# Patient Record
Sex: FEMALE | Race: BLACK OR AFRICAN AMERICAN | NOT HISPANIC OR LATINO | Employment: STUDENT | URBAN - METROPOLITAN AREA
[De-identification: names, ages, dates, MRNs, and addresses within clinical notes are randomized per-mention and may not be internally consistent; named-entity substitution may affect disease eponyms.]

---
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
## 2024-07-25 ENCOUNTER — HOSPITAL ENCOUNTER (EMERGENCY)
Facility: HOSPITAL | Age: 19
Discharge: HOME | End: 2024-07-25
Attending: EMERGENCY MEDICINE
Payer: MEDICAID

## 2024-07-25 VITALS
RESPIRATION RATE: 13 BRPM | DIASTOLIC BLOOD PRESSURE: 97 MMHG | SYSTOLIC BLOOD PRESSURE: 136 MMHG | BODY MASS INDEX: 17.37 KG/M2 | WEIGHT: 110.88 LBS | TEMPERATURE: 98 F | OXYGEN SATURATION: 100 % | HEART RATE: 86 BPM

## 2024-07-25 DIAGNOSIS — I10 HYPERTENSION: ICD-10-CM

## 2024-07-25 DIAGNOSIS — R10.84 GENERALIZED ABDOMINAL PAIN: Primary | ICD-10-CM

## 2024-07-25 LAB
ALBUMIN SERPL BCP-MCNC: 4.4 G/DL (ref 3.5–5.2)
ALP SERPL-CCNC: 81 U/L (ref 55–135)
ALT SERPL W/O P-5'-P-CCNC: 14 U/L (ref 10–44)
ANION GAP SERPL CALC-SCNC: 12 MMOL/L (ref 8–16)
AST SERPL-CCNC: 19 U/L (ref 10–40)
B-HCG UR QL: NEGATIVE
BACTERIA #/AREA URNS HPF: ABNORMAL /HPF
BASOPHILS # BLD AUTO: 0.03 K/UL (ref 0–0.2)
BASOPHILS NFR BLD: 0.6 % (ref 0–1.9)
BILIRUB SERPL-MCNC: 0.3 MG/DL (ref 0.1–1)
BILIRUB UR QL STRIP: NEGATIVE
BUN SERPL-MCNC: 5 MG/DL (ref 6–20)
CALCIUM SERPL-MCNC: 10.2 MG/DL (ref 8.7–10.5)
CHLORIDE SERPL-SCNC: 105 MMOL/L (ref 95–110)
CLARITY UR: CLEAR
CO2 SERPL-SCNC: 22 MMOL/L (ref 23–29)
COLOR UR: YELLOW
CREAT SERPL-MCNC: 0.9 MG/DL (ref 0.5–1.4)
DIFFERENTIAL METHOD BLD: NORMAL
EOSINOPHIL # BLD AUTO: 0.1 K/UL (ref 0–0.5)
EOSINOPHIL NFR BLD: 1.7 % (ref 0–8)
ERYTHROCYTE [DISTWIDTH] IN BLOOD BY AUTOMATED COUNT: 11.9 % (ref 11.5–14.5)
EST. GFR  (NO RACE VARIABLE): >60 ML/MIN/1.73 M^2
GLUCOSE SERPL-MCNC: 89 MG/DL (ref 70–110)
GLUCOSE UR QL STRIP: ABNORMAL
HCT VFR BLD AUTO: 43.8 % (ref 37–48.5)
HGB BLD-MCNC: 15.1 G/DL (ref 12–16)
HGB UR QL STRIP: NEGATIVE
HYALINE CASTS #/AREA URNS LPF: 5 /LPF
IMM GRANULOCYTES # BLD AUTO: 0 K/UL (ref 0–0.04)
IMM GRANULOCYTES NFR BLD AUTO: 0 % (ref 0–0.5)
KETONES UR QL STRIP: ABNORMAL
LEUKOCYTE ESTERASE UR QL STRIP: ABNORMAL
LIPASE SERPL-CCNC: 34 U/L (ref 4–60)
LYMPHOCYTES # BLD AUTO: 2.5 K/UL (ref 1–4.8)
LYMPHOCYTES NFR BLD: 47.1 % (ref 18–48)
MCH RBC QN AUTO: 30.1 PG (ref 27–31)
MCHC RBC AUTO-ENTMCNC: 34.5 G/DL (ref 32–36)
MCV RBC AUTO: 87 FL (ref 82–98)
MICROSCOPIC COMMENT: ABNORMAL
MONOCYTES # BLD AUTO: 0.3 K/UL (ref 0.3–1)
MONOCYTES NFR BLD: 5.6 % (ref 4–15)
NEUTROPHILS # BLD AUTO: 2.3 K/UL (ref 1.8–7.7)
NEUTROPHILS NFR BLD: 45 % (ref 38–73)
NITRITE UR QL STRIP: NEGATIVE
NRBC BLD-RTO: 0 /100 WBC
PH UR STRIP: 7 [PH] (ref 5–8)
PLATELET # BLD AUTO: 247 K/UL (ref 150–450)
PMV BLD AUTO: 9.2 FL (ref 9.2–12.9)
POTASSIUM SERPL-SCNC: 3.4 MMOL/L (ref 3.5–5.1)
PROT SERPL-MCNC: 8.5 G/DL (ref 6–8.4)
PROT UR QL STRIP: ABNORMAL
RBC # BLD AUTO: 5.02 M/UL (ref 4–5.4)
RBC #/AREA URNS HPF: 2 /HPF (ref 0–4)
SODIUM SERPL-SCNC: 139 MMOL/L (ref 136–145)
SP GR UR STRIP: >1.03 (ref 1–1.03)
SQUAMOUS #/AREA URNS HPF: 4 /HPF
TROPONIN I SERPL DL<=0.01 NG/ML-MCNC: 0.01 NG/ML (ref 0–0.03)
URN SPEC COLLECT METH UR: ABNORMAL
UROBILINOGEN UR STRIP-ACNC: ABNORMAL EU/DL
WBC # BLD AUTO: 5.2 K/UL (ref 3.9–12.7)
WBC #/AREA URNS HPF: 12 /HPF (ref 0–5)

## 2024-07-25 PROCEDURE — 83690 ASSAY OF LIPASE: CPT

## 2024-07-25 PROCEDURE — 93005 ELECTROCARDIOGRAM TRACING: CPT

## 2024-07-25 PROCEDURE — 99285 EMERGENCY DEPT VISIT HI MDM: CPT | Mod: 25

## 2024-07-25 PROCEDURE — 81000 URINALYSIS NONAUTO W/SCOPE: CPT

## 2024-07-25 PROCEDURE — 63600175 PHARM REV CODE 636 W HCPCS: Performed by: EMERGENCY MEDICINE

## 2024-07-25 PROCEDURE — 80053 COMPREHEN METABOLIC PANEL: CPT

## 2024-07-25 PROCEDURE — 84484 ASSAY OF TROPONIN QUANT: CPT

## 2024-07-25 PROCEDURE — 81025 URINE PREGNANCY TEST: CPT

## 2024-07-25 PROCEDURE — 93010 ELECTROCARDIOGRAM REPORT: CPT | Mod: ,,, | Performed by: INTERNAL MEDICINE

## 2024-07-25 PROCEDURE — 85025 COMPLETE CBC W/AUTO DIFF WBC: CPT

## 2024-07-25 PROCEDURE — 96374 THER/PROPH/DIAG INJ IV PUSH: CPT

## 2024-07-25 PROCEDURE — 87088 URINE BACTERIA CULTURE: CPT

## 2024-07-25 PROCEDURE — 96375 TX/PRO/DX INJ NEW DRUG ADDON: CPT

## 2024-07-25 PROCEDURE — 87086 URINE CULTURE/COLONY COUNT: CPT

## 2024-07-25 RX ORDER — MORPHINE SULFATE 4 MG/ML
4 INJECTION, SOLUTION INTRAMUSCULAR; INTRAVENOUS
Status: COMPLETED | OUTPATIENT
Start: 2024-07-25 | End: 2024-07-25

## 2024-07-25 RX ORDER — ONDANSETRON HYDROCHLORIDE 2 MG/ML
4 INJECTION, SOLUTION INTRAVENOUS
Status: COMPLETED | OUTPATIENT
Start: 2024-07-25 | End: 2024-07-25

## 2024-07-25 RX ORDER — ACETAMINOPHEN 500 MG
1000 TABLET ORAL
Status: DISCONTINUED | OUTPATIENT
Start: 2024-07-25 | End: 2024-07-25

## 2024-07-25 RX ADMIN — ONDANSETRON 4 MG: 2 INJECTION INTRAMUSCULAR; INTRAVENOUS at 06:07

## 2024-07-25 RX ADMIN — MORPHINE SULFATE 4 MG: 4 INJECTION INTRAVENOUS at 06:07

## 2024-07-25 NOTE — ED PROVIDER NOTES
SCRIBE #1 NOTE: I, Leidy Estes, am scribing for, and in the presence of, Max Cortez Jr., MD. I have scribed the entire note.       History     Chief Complaint   Patient presents with    Abdominal Pain     Mid abdominal pain and dizziness x 1.5 weeks. Also c/o High blood pressure and HA. Denies N/V/D, denies vaginal discharge or difficulty with urination.     Review of patient's allergies indicates:   Allergen Reactions    Iodine and iodide containing products     Nuts [tree nut]     Shellfish containing products          History of Present Illness     HPI    7/25/2024, 5:37 PM  History obtained from the patient      History of Present Illness: Khloe Evans is a 19 y.o. female patient with a PMHx of asthma who presents to the Emergency Department for evaluation of abdominal pain. Pt also complains of HTN with associated HA and dizziness x couple months. Pt reports the only medication she takes daily is birth control which she has been on for 2 years.  Denies any recent prescription changes. Pt states she is passing gas. Pt denies pain worsening at night. Symptoms are intermittent and moderate in severity. No mitigating or exacerbating factors reported. Patient denies any N/V, constipation, diarrhea, and all other sxs at this time.  No further complaints or concerns at this time. Pt's mother is at bed side.  Patient was notes some mild generalized abdominal pain.  This has been ongoing for several months.  She was not had any imaging.  There no fevers or chills.  The pain in his vague.      Arrival mode: Personal vehicle    PCP: Malgorzata Manzano NP        Past Medical History:  Past Medical History:   Diagnosis Date    Allergic state     Allergy     Asthma     Asthma, not well controlled     Eczema        Past Surgical History:  Past Surgical History:   Procedure Laterality Date    None           Family History:  No family history on file.    Social History:  Social History     Tobacco Use    Smoking status:  Never    Smokeless tobacco: Never    Tobacco comments:     No ALEXANDR   Substance and Sexual Activity    Alcohol use: No     Alcohol/week: 0.0 standard drinks of alcohol    Drug use: No    Sexual activity: Never        Review of Systems     Review of Systems   Gastrointestinal:  Positive for abdominal pain. Negative for constipation, diarrhea, nausea and vomiting.   Neurological:  Positive for dizziness and headaches.        Physical Exam     Initial Vitals [07/25/24 1718]   BP Pulse Resp Temp SpO2   (!) 181/93 (!) 125 18 98.3 °F (36.8 °C) 100 %      MAP       --          Physical Exam  Nursing Notes and Vital Signs Reviewed.  Constitutional: Patient is in no acute distress. Well-developed and well-nourished.  Head: Atraumatic. Normocephalic.  Eyes:  EOM intact.  No scleral icterus.  ENT: Mucous membranes are moist.  Nares clear   Neck:  Full ROM. No JVD.  Cardiovascular: Regular rate. Regular rhythm No murmurs, rubs, or gallops. Distal pulses are 2+ and symmetric  Pulmonary/Chest: No respiratory distress. Clear to auscultation bilaterally. No wheezing or rales.  Equal chest wall rise bilaterally  Abdominal: Soft and non-distended.  There is no tenderness.  No rebound, guarding, or rigidity. Good bowel sounds.  Negative Garland's.  No right lower quadrant tenderness.  Genitourinary: No CVA tenderness.  No suprapubic tenderness  Musculoskeletal: Moves all extremities. No obvious deformities.  5 x 5 strength in all extremities   Skin: Warm and dry.  Neurological:  Alert, awake, and appropriate.  Normal speech.  No acute focal neurological deficits are appreciated.  Two through 12 intact bilaterally.  Psychiatric: Normal affect. Good eye contact. Appropriate in content.       ED Course   Procedures  ED Vital Signs:  Vitals:    07/25/24 1718 07/25/24 1848 07/25/24 1900 07/25/24 2002   BP: (!) 181/93  (!) 155/89 (!) 144/81   Pulse: (!) 125  95 65   Resp: 18 18 18 17   Temp: 98.3 °F (36.8 °C)  98.3 °F (36.8 °C)    TempSrc:  Oral      SpO2: 100%  100% 100%   Weight: 50.3 kg (110 lb 14.3 oz)       07/25/24 2040   BP: (!) 136/97   Pulse: 86   Resp: 13   Temp:    TempSrc:    SpO2: 100%   Weight:        Abnormal Lab Results:  Labs Reviewed   COMPREHENSIVE METABOLIC PANEL - Abnormal       Result Value    Sodium 139      Potassium 3.4 (*)     Chloride 105      CO2 22 (*)     Glucose 89      BUN 5 (*)     Creatinine 0.9      Calcium 10.2      Total Protein 8.5 (*)     Albumin 4.4      Total Bilirubin 0.3      Alkaline Phosphatase 81      AST 19      ALT 14      eGFR >60      Anion Gap 12     URINALYSIS, REFLEX TO URINE CULTURE - Abnormal    Specimen UA Urine, Clean Catch      Color, UA Yellow      Appearance, UA Clear      pH, UA 7.0      Specific Gravity, UA >1.030 (*)     Protein, UA 3+ (*)     Glucose, UA Trace (*)     Ketones, UA Trace (*)     Bilirubin (UA) Negative      Occult Blood UA Negative      Nitrite, UA Negative      Urobilinogen, UA 2.0-3.0 (*)     Leukocytes, UA 1+ (*)     Narrative:     Specimen Source->Urine   URINALYSIS MICROSCOPIC - Abnormal    RBC, UA 2      WBC, UA 12 (*)     Bacteria None      Squam Epithel, UA 4      Hyaline Casts, UA 5 (*)     Microscopic Comment SEE COMMENT      Narrative:     Specimen Source->Urine   CULTURE, URINE   CBC W/ AUTO DIFFERENTIAL    WBC 5.20      RBC 5.02      Hemoglobin 15.1      Hematocrit 43.8      MCV 87      MCH 30.1      MCHC 34.5      RDW 11.9      Platelets 247      MPV 9.2      Immature Granulocytes 0.0      Gran # (ANC) 2.3      Immature Grans (Abs) 0.00      Lymph # 2.5      Mono # 0.3      Eos # 0.1      Baso # 0.03      nRBC 0      Gran % 45.0      Lymph % 47.1      Mono % 5.6      Eosinophil % 1.7      Basophil % 0.6      Differential Method Automated     LIPASE    Lipase 34     PREGNANCY TEST, URINE RAPID    Preg Test, Ur Negative      Narrative:     Specimen Source->Urine   TROPONIN I    Troponin I 0.009          All Lab Results:  Results for orders placed or performed  during the hospital encounter of 07/25/24   CBC W/ AUTO DIFFERENTIAL   Result Value Ref Range    WBC 5.20 3.90 - 12.70 K/uL    RBC 5.02 4.00 - 5.40 M/uL    Hemoglobin 15.1 12.0 - 16.0 g/dL    Hematocrit 43.8 37.0 - 48.5 %    MCV 87 82 - 98 fL    MCH 30.1 27.0 - 31.0 pg    MCHC 34.5 32.0 - 36.0 g/dL    RDW 11.9 11.5 - 14.5 %    Platelets 247 150 - 450 K/uL    MPV 9.2 9.2 - 12.9 fL    Immature Granulocytes 0.0 0.0 - 0.5 %    Gran # (ANC) 2.3 1.8 - 7.7 K/uL    Immature Grans (Abs) 0.00 0.00 - 0.04 K/uL    Lymph # 2.5 1.0 - 4.8 K/uL    Mono # 0.3 0.3 - 1.0 K/uL    Eos # 0.1 0.0 - 0.5 K/uL    Baso # 0.03 0.00 - 0.20 K/uL    nRBC 0 0 /100 WBC    Gran % 45.0 38.0 - 73.0 %    Lymph % 47.1 18.0 - 48.0 %    Mono % 5.6 4.0 - 15.0 %    Eosinophil % 1.7 0.0 - 8.0 %    Basophil % 0.6 0.0 - 1.9 %    Differential Method Automated    Comp. Metabolic Panel   Result Value Ref Range    Sodium 139 136 - 145 mmol/L    Potassium 3.4 (L) 3.5 - 5.1 mmol/L    Chloride 105 95 - 110 mmol/L    CO2 22 (L) 23 - 29 mmol/L    Glucose 89 70 - 110 mg/dL    BUN 5 (L) 6 - 20 mg/dL    Creatinine 0.9 0.5 - 1.4 mg/dL    Calcium 10.2 8.7 - 10.5 mg/dL    Total Protein 8.5 (H) 6.0 - 8.4 g/dL    Albumin 4.4 3.5 - 5.2 g/dL    Total Bilirubin 0.3 0.1 - 1.0 mg/dL    Alkaline Phosphatase 81 55 - 135 U/L    AST 19 10 - 40 U/L    ALT 14 10 - 44 U/L    eGFR >60 >60 mL/min/1.73 m^2    Anion Gap 12 8 - 16 mmol/L   Lipase   Result Value Ref Range    Lipase 34 4 - 60 U/L   Urinalysis, Reflex to Urine Culture Urine, Clean Catch    Specimen: Urine   Result Value Ref Range    Specimen UA Urine, Clean Catch     Color, UA Yellow Yellow, Straw, Luciana    Appearance, UA Clear Clear    pH, UA 7.0 5.0 - 8.0    Specific Gravity, UA >1.030 (A) 1.005 - 1.030    Protein, UA 3+ (A) Negative    Glucose, UA Trace (A) Negative    Ketones, UA Trace (A) Negative    Bilirubin (UA) Negative Negative    Occult Blood UA Negative Negative    Nitrite, UA Negative Negative    Urobilinogen, UA  2.0-3.0 (A) <2.0 EU/dL    Leukocytes, UA 1+ (A) Negative   Pregnancy, urine rapid   Result Value Ref Range    Preg Test, Ur Negative    Troponin I   Result Value Ref Range    Troponin I 0.009 0.000 - 0.026 ng/mL   Urinalysis Microscopic   Result Value Ref Range    RBC, UA 2 0 - 4 /hpf    WBC, UA 12 (H) 0 - 5 /hpf    Bacteria None None-Occ /hpf    Squam Epithel, UA 4 /hpf    Hyaline Casts, UA 5 (A) 0-1/lpf /lpf    Microscopic Comment SEE COMMENT          Imaging Results:  Imaging Results              CT Abdomen Pelvis  Without Contrast (Final result)  Result time 07/25/24 20:34:26      Final result by Rosemary Ortiz MD (07/25/24 20:34:26)                   Impression:      No acute finding      Electronically signed by: Rosemary Ortiz  Date:    07/25/2024  Time:    20:34               Narrative:    EXAMINATION:  CT ABDOMEN PELVIS WITHOUT CONTRAST    CLINICAL HISTORY:  Abdominal abscess/infection suspected;    TECHNIQUE:  Low dose axial images, sagittal and coronal reformations were obtained from the lung bases to the pubic symphysis.  Contrast was not administered.    COMPARISON:  None    FINDINGS:  Liver pancreas and spleen unremarkable unenhanced    Kidneys without hydronephrosis.  Faint medullary micro calcification possible.    Urinary bladder decompressed    No obstructive bowel findings.  No signs of appendicitis                                       X-Ray Chest AP Portable (Final result)  Result time 07/25/24 19:20:35      Final result by Rosemary Ortiz MD (07/25/24 19:20:35)                   Impression:      No acute abnormality.      Electronically signed by: Rosemary Ortiz  Date:    07/25/2024  Time:    19:20               Narrative:    EXAMINATION:  XR CHEST AP PORTABLE    CLINICAL HISTORY:  . Essential (primary) hypertension    TECHNIQUE:  Single frontal portable view of the chest was performed.    COMPARISON:  None    FINDINGS:  Support devices: None    The lungs are clear, with normal  appearance of pulmonary vasculature and no pleural effusion or pneumothorax.                                       The EKG was ordered, reviewed, and independently interpreted by the ED provider.  Interpretation time: 17:39  Rate: 83 BPM  Rhythm: normal sinus rhythm with sinus arrhythmia   Interpretation: No acute ST or T wave abnormalities. No STEMI.             The Emergency Provider reviewed the vital signs and test results, which are outlined above.     ED Discussion     8:43 PM: Reassessed pt at this time. Discussed with pt all pertinent ED information and results. Discussed pt dx and plan of tx. Gave pt all f/u and return to the ED instructions. All questions and concerns were addressed at this time. Pt expresses understanding of information and instructions, and is comfortable with plan to discharge. Pt is stable for discharge.    I discussed with patient and/or family/caretaker that evaluation in the ED does not suggest any emergent or life threatening medical conditions requiring immediate intervention beyond what was provided in the ED, and I believe patient is safe for discharge.  Regardless, an unremarkable evaluation in the ED does not preclude the development or presence of a serious of life threatening condition. As such, patient was instructed to return immediately for any worsening or change in current symptoms.      ED Course as of 07/25/24 2153   Thu Jul 25, 2024   1824 Cardiac monitor interpretation  Independent interpretation  Indication: Hypertension  Normal sinus rhythm.  Rate 85.  No STEMI [RT]      ED Course User Index  [RT] Max Cortez Jr., MD     Medical Decision Making  Differential diagnosis: Hypertension, hypertensive urgency, hypertensive emergency, cardiac event, abdominal pain, acid reflux    Patient was evaluated with the history and physical examination.  Patient was had longstanding abdominal pain has elevated blood pressure.  The blood pressure is asymptomatic however.   Patient was received morphine and a workup was undertaken.  Workup is negative.  Light of the patient was ongoing symptoms and prior evaluation CT imaging was ordered which is negative he was no evidence of end-organ damage from her blood pressure as well.  Discussed all findings with the patient was mother.  I will refer to GI and start Pepcid twice daily with Tums for breakthrough symptoms.  I will not start blood pressure medications at this time due to underlying symptoms and the fact that her blood pressure markedly improved with pain control.  Patient was does have an underlying history of anxiety and I will refer to her primary care provider for further evaluation and treatment if needed.  Patient was in her mother verbalized understanding and agreement with the plan of care and seems reliable    Amount and/or Complexity of Data Reviewed  Independent Historian: parent  Labs: ordered. Decision-making details documented in ED Course.     Details: Troponin and lipase are normal.  CMP shows a potassium 3.4 but is otherwise benign.  Normal white count.  Normal H&H.  UA does not show an infection.  UPT is negative  Radiology: ordered. Decision-making details documented in ED Course.     Details: Chest x-ray is negative.  CT abdomen and pelvis shows no acute findings  ECG/medicine tests: ordered and independent interpretation performed. Decision-making details documented in ED Course.  Discussion of management or test interpretation with external provider(s): No STEMI    Risk  OTC drugs.  Prescription drug management.  Parenteral controlled substances.  Decision regarding hospitalization.                ED Medication(s):  Medications   ondansetron injection 4 mg (4 mg Intravenous Given by Other 7/25/24 4363)   morphine injection 4 mg (4 mg Intravenous Given by Other 7/25/24 4878)       Discharge Medication List as of 7/25/2024  8:44 PM           Follow-up Information       Malgorzata Manzano NP.    Specialty:  Pediatrics  Contact information:  [address] [place] [address]  [phone]               Genny Nieves MD.    Specialty: Gastroenterology  Contact information:  [address] [facility]  [place] [address]  [phone]                                 Scribe Attestation:   Scribe #1: I performed the above scribed service and the documentation accurately describes the services I performed. I attest to the accuracy of the note.     Attending:   Physician Attestation Statement for Scribe #1: I, Max Cortez Jr., MD, personally performed the services described in this documentation, as scribed by Leidy Estes, in my presence, and it is both accurate and complete.           Clinical Impression       ICD-10-CM ICD-9-CM   1. Generalized abdominal pain  R10.84 789.07   2. Hypertension  I10 401.9       Disposition:   Disposition: Discharged  Condition: Stable         Max Cortez Jr., MD  07/25/24 8467

## 2024-07-25 NOTE — FIRST PROVIDER EVALUATION
Medical screening examination initiated.  I have conducted a focused provider triage encounter, findings are as follows:    Brief history of present illness:  Hypertension, headaches, abdominal pain for the past week and a half.  Reports that she has seen her primary care doctor for elevated blood pressure readings, has not put her on a blood pressure medications yet.    There were no vitals filed for this visit.    Pertinent physical exam:  Headache, hypertension, abdominal pain    Brief workup plan:  Workup    Preliminary workup initiated; this workup will be continued and followed by the physician or advanced practice provider that is assigned to the patient when roomed.

## 2024-07-26 LAB
OHS QRS DURATION: 72 MS
OHS QTC CALCULATION: 439 MS

## 2024-07-26 NOTE — DISCHARGE INSTRUCTIONS
Your high blood pressure is likely related to both your abdominal pain and your anxiety.  You may put you in danger to start blood pressure medications at this time therefore we will not start them.  I would recommend starting Pepcid 20 mg orally twice daily and use Tums for breakthrough symptoms free or abdominal pain.  This may resolve.  She is.  If so, your blood pressure showed improve.  Have your anxiety dressed by your primary care provider.  They may desire to stored she wants some medications for this.  I do recommend you see a GI doctor.  On-call for us today is listed above.  To your insurance, a referral from your primary care provider is required.  Police called both to arrange this follow up.  Return as needed

## 2024-07-26 NOTE — ED NOTES
"Pt reports HA that started 1 week ago, constant pain, denies any aggravating factors. Pain has subsided with Morphine. Pt also reports abdominal pain x 1 week that is located to the RLQ and umbilical area. Pt also reports feelings of "feeling like I was going to pass out". LMP 2 weeks ago and lasts 4-5 days, last BM yesterday, soft and brown, denies melena. Reports being sexually active and endorses condom use. Reports symptoms are worse at night time overall. Pt reports being diagnosed with anxiety but not being prescribed anything. Pt mother at bedside, bed in lowest position, call bell within reach, will continue to monitor.  "

## 2024-07-27 LAB — BACTERIA UR CULT: ABNORMAL

## 2024-07-31 ENCOUNTER — OFFICE VISIT (OUTPATIENT)
Dept: ALLERGY | Facility: CLINIC | Age: 19
End: 2024-07-31
Payer: MEDICAID

## 2024-07-31 VITALS
DIASTOLIC BLOOD PRESSURE: 78 MMHG | SYSTOLIC BLOOD PRESSURE: 127 MMHG | OXYGEN SATURATION: 98 % | BODY MASS INDEX: 17.37 KG/M2 | HEART RATE: 85 BPM | TEMPERATURE: 98 F | HEIGHT: 67 IN

## 2024-07-31 DIAGNOSIS — J30.89 ALLERGIC RHINITIS DUE TO MOLD: ICD-10-CM

## 2024-07-31 DIAGNOSIS — Z91.013 FISH ALLERGY: ICD-10-CM

## 2024-07-31 DIAGNOSIS — J30.81 ALLERGIC RHINITIS DUE TO ANIMAL DANDER: ICD-10-CM

## 2024-07-31 DIAGNOSIS — T78.1XXA ADVERSE FOOD REACTION, INITIAL ENCOUNTER: ICD-10-CM

## 2024-07-31 DIAGNOSIS — Z91.013 ALLERGY TO MOLLUSK: ICD-10-CM

## 2024-07-31 DIAGNOSIS — Z91.013 SHELLFISH ALLERGY: ICD-10-CM

## 2024-07-31 DIAGNOSIS — J30.1 SEASONAL ALLERGIC RHINITIS DUE TO POLLEN: Primary | ICD-10-CM

## 2024-07-31 DIAGNOSIS — L20.89 OTHER ATOPIC DERMATITIS: ICD-10-CM

## 2024-07-31 PROCEDURE — 95004 PERQ TESTS W/ALRGNC XTRCS: CPT | Mod: PBBFAC | Performed by: STUDENT IN AN ORGANIZED HEALTH CARE EDUCATION/TRAINING PROGRAM

## 2024-07-31 PROCEDURE — 99999 PR PBB SHADOW E&M-EST. PATIENT-LVL III: CPT | Mod: PBBFAC,,, | Performed by: STUDENT IN AN ORGANIZED HEALTH CARE EDUCATION/TRAINING PROGRAM

## 2024-07-31 PROCEDURE — 1159F MED LIST DOCD IN RCRD: CPT | Mod: CPTII,,, | Performed by: STUDENT IN AN ORGANIZED HEALTH CARE EDUCATION/TRAINING PROGRAM

## 2024-07-31 PROCEDURE — 95004 PERQ TESTS W/ALRGNC XTRCS: CPT | Mod: S$PBB,,, | Performed by: STUDENT IN AN ORGANIZED HEALTH CARE EDUCATION/TRAINING PROGRAM

## 2024-07-31 PROCEDURE — 99204 OFFICE O/P NEW MOD 45 MIN: CPT | Mod: S$PBB,25,, | Performed by: STUDENT IN AN ORGANIZED HEALTH CARE EDUCATION/TRAINING PROGRAM

## 2024-07-31 PROCEDURE — 3074F SYST BP LT 130 MM HG: CPT | Mod: CPTII,,, | Performed by: STUDENT IN AN ORGANIZED HEALTH CARE EDUCATION/TRAINING PROGRAM

## 2024-07-31 PROCEDURE — 3008F BODY MASS INDEX DOCD: CPT | Mod: CPTII,,, | Performed by: STUDENT IN AN ORGANIZED HEALTH CARE EDUCATION/TRAINING PROGRAM

## 2024-07-31 PROCEDURE — 99213 OFFICE O/P EST LOW 20 MIN: CPT | Mod: PBBFAC | Performed by: STUDENT IN AN ORGANIZED HEALTH CARE EDUCATION/TRAINING PROGRAM

## 2024-07-31 PROCEDURE — 3078F DIAST BP <80 MM HG: CPT | Mod: CPTII,,, | Performed by: STUDENT IN AN ORGANIZED HEALTH CARE EDUCATION/TRAINING PROGRAM

## 2024-07-31 PROCEDURE — 4010F ACE/ARB THERAPY RXD/TAKEN: CPT | Mod: CPTII,,, | Performed by: STUDENT IN AN ORGANIZED HEALTH CARE EDUCATION/TRAINING PROGRAM

## 2024-07-31 RX ORDER — TRIAMCINOLONE ACETONIDE 1 MG/G
OINTMENT TOPICAL 2 TIMES DAILY
Qty: 80 G | Refills: 11 | Status: SHIPPED | OUTPATIENT
Start: 2024-07-31 | End: 2025-07-31

## 2024-07-31 RX ORDER — EPINEPHRINE 0.3 MG/.3ML
1 INJECTION SUBCUTANEOUS ONCE
Qty: 0.3 ML | Refills: 0 | Status: SHIPPED | OUTPATIENT
Start: 2024-07-31 | End: 2024-07-31

## 2024-07-31 NOTE — PROGRESS NOTES
Allergy and Immunology  New Patient Clinic Note    Date: 7/31/2024  Chief Complaint   Patient presents with    Allergy Testing     Pt states that she wants to get tested for food and environmental allergies: Pt also states that she has been in and out of the hospital and mom made appointment to get her tested.      Referred by: Self, Aaareferral  No address on file    History  Khloe Evans is a 19 y.o. female being seen as a New Patient today.    Allergic Rhinitis due to dog, cockroach, mold, and tree/grass/weed pollen   - Onset: Early   - Symptoms: Congestion, rhinorrhea, sneezing - improved with age but still present   - Suspected triggers include: Environmental - prior positives in early childhood per patient   - Pattern: Perennial with Seasonal Exacerbations  - Medications: PRN antihistamines with no acute complaints at this time  - Patient does NOT have a dog     Chronic or Inducible Urticaria  - No hx of chronic urticaria     Mild Intermittent Asthma   - Onset: 2-3 years of age   - Symptoms: Chest tightness and SOB, wheezing   - Medications: Montelukast, albuterol PRN   - PO Steroids: Years since last time   - Hospitalization/Intubation: No   - Suspected triggers include: No clear trigger   - Smoking: No   - ASA/NSAID use: No   - Hx of CRSwNP: No   - Control/Albuterol Use: Well controlled, with minimal use of albuterol   - Nocturnal symptoms: Denied     CRSwNP  - No hx of CRSwNP     Eczema   - As an infant moderate with subsequent improvement   - Infrequent flares and mild disease at this time     Eosinophilic Esophagitis  - No hx of eosinophilic esophagitis     Food Allergy  - Tree Nuts: itching of mouth and throat when younger   - Patient reported avoiding - does NOT avoid peanuts   - Seafood: Lifelong avoidance with no hx of reactions   - Skin testing at a young age with positives and avoided without known exposure     Drug Allergy  - No hx of drug allergy     Recurrent Infections  - No hx of recurrent  infections     Venom Allergy  - No hx of venom allergy     Allergies, PMH, PSH, Social, and Family History were reviewed.    Review of patient's allergies indicates:   Allergen Reactions    Shellfish containing products       Past Medical History:   Diagnosis Date    Allergic state     Allergy     Asthma     Asthma, not well controlled     Eczema      Past Surgical History:   Procedure Laterality Date    None       Social History     Social History Narrative    Lives with family.    Mom is a teacher.  Dad is a .     S/he reports that she has never smoked. She has never used smokeless tobacco. She reports that she does not drink alcohol and does not use drugs.    Current Outpatient Medications on File Prior to Visit   Medication Sig Dispense Refill    albuterol (PROVENTIL) 2.5 mg /3 mL (0.083 %) nebulizer solution Take 2.5 mg by nebulization every 6 (six) hours as needed for Wheezing.      albuterol 90 mcg/actuation inhaler Inhale 2 puffs into the lungs every 4 (four) hours as needed for Wheezing. 1 Inhaler 2    albuterol 90 mcg/actuation inhaler Inhale 2 puffs into the lungs every 4 (four) hours as needed for Wheezing. Rescue 1 Inhaler 2    [DISCONTINUED] hydrOXYzine HCL (ATARAX) 25 MG tablet Take 1 tablet (25 mg total) by mouth 3 (three) times daily. 20 tablet 0    [DISCONTINUED] ibuprofen (ADVIL,MOTRIN) 400 MG tablet Take 1 tablet (400 mg total) by mouth every 6 (six) hours as needed. 20 tablet 0    [DISCONTINUED] beclomethasone (QVAR) 80 mcg/actuation Aero Inhale 1 puff into the lungs 2 (two) times daily. 1 each 2    [DISCONTINUED] cetirizine (ZYRTEC) 10 MG tablet Take 1 tablet (10 mg total) by mouth once daily. for 14 days 14 tablet 0     No current facility-administered medications on file prior to visit.     Physical Examination  Vitals:    07/31/24 1448   BP: 127/78   Pulse: 85   Temp: 97.7 °F (36.5 °C)     GENERAL:  female in no apparent distress and well developed and well nourished  HEAD:   Normocephalic, without obvious abnormality, atraumatic  EYES: sclera anicteric, conjunctiva normochromic  EARS: normal TM's and external ear canals both ears  NOSE: pale and boggy, clear and copious discharge, turbinates pink, swollen, swollen, grade 2 out of 4    OROPHARYNX: moist mucous membranes without erythema, exudates or petechiae  LYMPH NODES: normal, supple, no lymphadenopathy  LUNGS: clear to auscultation, no wheezes, rales or rhonchi, symmetric air entry.  HEART: normal rate, regular rhythm, normal S1, S2, no murmurs, rubs, clicks or gallops.  ABDOMEN: soft, nontender, nondistended, no masses or organomegaly.  MUSCULOSKELETAL: no gross joint deformity or swelling.  NEURO: alert, oriented, normal speech, no focal findings or movement disorder noted.  SKIN: normal coloration and turgor, no rashes, no suspicious skin lesions noted.     Allergy Skin Tests  Allergy skin prick tests to inhalants and foods were positive to: mold spores, dog dander, cockroach, tree pollen, grass pollen, weed pollen, crustaceans, shellfish, and finned fish and negative to house dust mites, cat dander, horse dander, peanuts, tree nuts, milk, eggs, and wheat with adequate histamine and negative control. Test is valid. Serum testing for shellfish due to large finned fish reaction possible bleeding into the crustacean testing.   - See media for results    Assessment/Plan:   Problem List Items Addressed This Visit          ENT    Seasonal allergic rhinitis due to pollen - Primary    Overview     - 07/31/2024: SPT positive to cockroach, mold, and tree/grass/weed pollen         Current Assessment & Plan     - No major complaints at this time   - Continue PRN medications at this time   - Educated on environmental controls for AR triggers   - Will continue to monitor and reassess          Allergic rhinitis due to animal dander    Overview     - 07/31/2024: SPT positive to cockroach, mold, and tree/grass/weed pollen         Allergic  rhinitis due to mold    Overview     - 07/31/2024: SPT positive to cockroach, mold, and tree/grass/weed pollen            Derm    Other atopic dermatitis    Current Assessment & Plan     - Mild case with no acute flare   - Ordered Triamcinolone 0.1% ointment PRN   - Educated on moisturizing routine and environmental controls   - Will continue to monitor andr reassess          Relevant Medications    triamcinolone acetonide 0.1% (KENALOG) 0.1 % ointment       Other    Adverse food reaction    Overview     - 07/31/2024: SPT positive to fish, shellfish, and mollusk   - 07/31/2024: SPT negative to peanut and tree nuts     - Tree Nuts: itching of mouth and throat when younger   - Patient reported avoiding - does NOT avoid peanuts   - Seafood: Lifelong avoidance with no hx of reactions   - Skin testing at a young age with positives and avoided without known exposure          Current Assessment & Plan     - Educated on avoidance of SPT positive foods   - Concern for potential false positive to shellfish  - Ordered Serum IgE to shellfish and mollusk   - Ordered EpiPen PRN and demonstrated proper use   - Will continue to monitor and reassess          Relevant Orders    Allergen Shrimp IgE    Lobster IgE    Crayfish, freshwater IgE    Allergen Crab IgE    Allergen Oyster IgE    Allergen Clams IgE    Allergen Scallop IgE    Allergen - Pacific Squid    Fish allergy    Overview     - 07/31/2024: SPT positive to fish, shellfish, and mollusk   - 07/31/2024: SPT negative to peanut and tree nuts          Shellfish allergy    Overview     - 07/31/2024: SPT positive to fish, shellfish, and mollusk   - 07/31/2024: SPT negative to peanut and tree nuts          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    Kesler Bourgoyne, MD [facility]  Allergy and  Immunology

## 2024-08-01 ENCOUNTER — LAB VISIT (OUTPATIENT)
Dept: LAB | Facility: HOSPITAL | Age: 19
End: 2024-08-01
Attending: STUDENT IN AN ORGANIZED HEALTH CARE EDUCATION/TRAINING PROGRAM
Payer: MEDICAID

## 2024-08-01 DIAGNOSIS — T78.1XXA ADVERSE FOOD REACTION, INITIAL ENCOUNTER: ICD-10-CM

## 2024-08-01 PROBLEM — L20.89 OTHER ATOPIC DERMATITIS: Status: ACTIVE | Noted: 2024-08-01

## 2024-08-01 PROBLEM — J30.81 ALLERGIC RHINITIS DUE TO ANIMAL DANDER: Status: ACTIVE | Noted: 2024-08-01

## 2024-08-01 PROBLEM — Z91.013 FISH ALLERGY: Status: ACTIVE | Noted: 2024-08-01

## 2024-08-01 PROBLEM — J30.1 SEASONAL ALLERGIC RHINITIS DUE TO POLLEN: Status: ACTIVE | Noted: 2024-08-01

## 2024-08-01 PROBLEM — Z91.013 ALLERGY TO MOLLUSK: Status: ACTIVE | Noted: 2024-08-01

## 2024-08-01 PROBLEM — J30.89 ALLERGIC RHINITIS DUE TO MOLD: Status: ACTIVE | Noted: 2024-08-01

## 2024-08-01 PROBLEM — Z91.013 SHELLFISH ALLERGY: Status: ACTIVE | Noted: 2024-08-01

## 2024-08-01 PROCEDURE — 86003 ALLG SPEC IGE CRUDE XTRC EA: CPT | Performed by: STUDENT IN AN ORGANIZED HEALTH CARE EDUCATION/TRAINING PROGRAM

## 2024-08-01 PROCEDURE — 36415 COLL VENOUS BLD VENIPUNCTURE: CPT | Performed by: STUDENT IN AN ORGANIZED HEALTH CARE EDUCATION/TRAINING PROGRAM

## 2024-08-01 PROCEDURE — 86003 ALLG SPEC IGE CRUDE XTRC EA: CPT | Mod: 59 | Performed by: STUDENT IN AN ORGANIZED HEALTH CARE EDUCATION/TRAINING PROGRAM

## 2024-08-01 NOTE — ASSESSMENT & PLAN NOTE
- Mild case with no acute flare   - Ordered Triamcinolone 0.1% ointment PRN   - Educated on moisturizing routine and environmental controls   - Will continue to monitor andr reassess

## 2024-08-01 NOTE — ASSESSMENT & PLAN NOTE
- Educated on avoidance of SPT positive foods   - Concern for potential false positive to shellfish  - Ordered Serum IgE to shellfish and mollusk   - Ordered EpiPen PRN and demonstrated proper use   - Will continue to monitor and reassess

## 2024-08-01 NOTE — ASSESSMENT & PLAN NOTE
- No major complaints at this time   - Continue PRN medications at this time   - Educated on environmental controls for AR triggers   - Will continue to monitor and reassess

## 2024-08-06 LAB
CLAM IGE QN: 1.27 KU/L
CRAB IGE QN: 4.65 KU/L
CRAWFISH IGE QN: 5.04 KU/L
LOBSTER IGE QN: 4.88 KU/L
OYSTER IGE QN: 0.33 KU/L
PACIFIC SQUID IGE QN: 2 KU/L
RAST CLASS: ABNORMAL
SCALLOP IGE QN: 1.92 KU/L
SHRIMP IGE QN: 5.3 KU/L

## 2024-08-08 ENCOUNTER — HOSPITAL ENCOUNTER (EMERGENCY)
Facility: HOSPITAL | Age: 19
Discharge: HOME | End: 2024-08-08
Attending: FAMILY MEDICINE
Payer: MEDICAID

## 2024-08-08 ENCOUNTER — PATIENT MESSAGE (OUTPATIENT)
Dept: ALLERGY | Facility: CLINIC | Age: 19
End: 2024-08-08
Payer: MEDICAID

## 2024-08-08 VITALS
OXYGEN SATURATION: 99 % | WEIGHT: 107.13 LBS | HEART RATE: 99 BPM | TEMPERATURE: 98 F | RESPIRATION RATE: 16 BRPM | DIASTOLIC BLOOD PRESSURE: 82 MMHG | SYSTOLIC BLOOD PRESSURE: 148 MMHG | BODY MASS INDEX: 16.78 KG/M2

## 2024-08-08 DIAGNOSIS — G44.209 TENSION-TYPE HEADACHE, NOT INTRACTABLE, UNSPECIFIED CHRONICITY PATTERN: Primary | ICD-10-CM

## 2024-08-08 LAB
ALBUMIN SERPL BCP-MCNC: 3.6 G/DL (ref 3.5–5.2)
ALP SERPL-CCNC: 62 U/L (ref 55–135)
ALT SERPL W/O P-5'-P-CCNC: 11 U/L (ref 10–44)
ANION GAP SERPL CALC-SCNC: 11 MMOL/L (ref 8–16)
AST SERPL-CCNC: 13 U/L (ref 10–40)
B-HCG UR QL: NEGATIVE
BACTERIA #/AREA URNS HPF: ABNORMAL /HPF
BASOPHILS # BLD AUTO: 0.03 K/UL (ref 0–0.2)
BASOPHILS NFR BLD: 0.5 % (ref 0–1.9)
BILIRUB SERPL-MCNC: 0.3 MG/DL (ref 0.1–1)
BILIRUB UR QL STRIP: NEGATIVE
BUN SERPL-MCNC: 8 MG/DL (ref 6–20)
CALCIUM SERPL-MCNC: 9.1 MG/DL (ref 8.7–10.5)
CHLORIDE SERPL-SCNC: 103 MMOL/L (ref 95–110)
CLARITY UR: CLEAR
CO2 SERPL-SCNC: 25 MMOL/L (ref 23–29)
COLOR UR: YELLOW
CREAT SERPL-MCNC: 0.8 MG/DL (ref 0.5–1.4)
DIFFERENTIAL METHOD BLD: ABNORMAL
EOSINOPHIL # BLD AUTO: 0.1 K/UL (ref 0–0.5)
EOSINOPHIL NFR BLD: 2.3 % (ref 0–8)
ERYTHROCYTE [DISTWIDTH] IN BLOOD BY AUTOMATED COUNT: 11.6 % (ref 11.5–14.5)
EST. GFR  (NO RACE VARIABLE): >60 ML/MIN/1.73 M^2
GLUCOSE SERPL-MCNC: 91 MG/DL (ref 70–110)
GLUCOSE UR QL STRIP: NEGATIVE
HCT VFR BLD AUTO: 35.8 % (ref 37–48.5)
HGB BLD-MCNC: 12.4 G/DL (ref 12–16)
HGB UR QL STRIP: ABNORMAL
HYALINE CASTS #/AREA URNS LPF: 3 /LPF
IMM GRANULOCYTES # BLD AUTO: 0.01 K/UL (ref 0–0.04)
IMM GRANULOCYTES NFR BLD AUTO: 0.2 % (ref 0–0.5)
KETONES UR QL STRIP: NEGATIVE
LEUKOCYTE ESTERASE UR QL STRIP: NEGATIVE
LYMPHOCYTES # BLD AUTO: 1.5 K/UL (ref 1–4.8)
LYMPHOCYTES NFR BLD: 25.6 % (ref 18–48)
MCH RBC QN AUTO: 30.4 PG (ref 27–31)
MCHC RBC AUTO-ENTMCNC: 34.6 G/DL (ref 32–36)
MCV RBC AUTO: 88 FL (ref 82–98)
MICROSCOPIC COMMENT: ABNORMAL
MONOCYTES # BLD AUTO: 0.3 K/UL (ref 0.3–1)
MONOCYTES NFR BLD: 5.4 % (ref 4–15)
NEUTROPHILS # BLD AUTO: 3.9 K/UL (ref 1.8–7.7)
NEUTROPHILS NFR BLD: 66 % (ref 38–73)
NITRITE UR QL STRIP: NEGATIVE
NRBC BLD-RTO: 0 /100 WBC
PH UR STRIP: 6 [PH] (ref 5–8)
PLATELET # BLD AUTO: 211 K/UL (ref 150–450)
PMV BLD AUTO: 9.3 FL (ref 9.2–12.9)
POTASSIUM SERPL-SCNC: 3.6 MMOL/L (ref 3.5–5.1)
PROT SERPL-MCNC: 7.8 G/DL (ref 6–8.4)
PROT UR QL STRIP: ABNORMAL
RBC # BLD AUTO: 4.08 M/UL (ref 4–5.4)
RBC #/AREA URNS HPF: 1 /HPF (ref 0–4)
SODIUM SERPL-SCNC: 139 MMOL/L (ref 136–145)
SP GR UR STRIP: 1.03 (ref 1–1.03)
SQUAMOUS #/AREA URNS HPF: 5 /HPF
URN SPEC COLLECT METH UR: ABNORMAL
UROBILINOGEN UR STRIP-ACNC: NEGATIVE EU/DL
WBC # BLD AUTO: 5.97 K/UL (ref 3.9–12.7)
WBC #/AREA URNS HPF: 8 /HPF (ref 0–5)

## 2024-08-08 PROCEDURE — 96375 TX/PRO/DX INJ NEW DRUG ADDON: CPT

## 2024-08-08 PROCEDURE — 81025 URINE PREGNANCY TEST: CPT | Performed by: NURSE PRACTITIONER

## 2024-08-08 PROCEDURE — 85025 COMPLETE CBC W/AUTO DIFF WBC: CPT | Performed by: NURSE PRACTITIONER

## 2024-08-08 PROCEDURE — 81000 URINALYSIS NONAUTO W/SCOPE: CPT | Performed by: NURSE PRACTITIONER

## 2024-08-08 PROCEDURE — 25000003 PHARM REV CODE 250: Performed by: NURSE PRACTITIONER

## 2024-08-08 PROCEDURE — 96374 THER/PROPH/DIAG INJ IV PUSH: CPT

## 2024-08-08 PROCEDURE — 80053 COMPREHEN METABOLIC PANEL: CPT | Performed by: NURSE PRACTITIONER

## 2024-08-08 PROCEDURE — 63600175 PHARM REV CODE 636 W HCPCS: Performed by: NURSE PRACTITIONER

## 2024-08-08 PROCEDURE — 99285 EMERGENCY DEPT VISIT HI MDM: CPT | Mod: 25

## 2024-08-08 RX ORDER — ACETAMINOPHEN 500 MG
1000 TABLET ORAL
Status: COMPLETED | OUTPATIENT
Start: 2024-08-08 | End: 2024-08-08

## 2024-08-08 RX ORDER — METOCLOPRAMIDE HYDROCHLORIDE 5 MG/ML
10 INJECTION INTRAMUSCULAR; INTRAVENOUS
Status: COMPLETED | OUTPATIENT
Start: 2024-08-08 | End: 2024-08-08

## 2024-08-08 RX ORDER — KETOROLAC TROMETHAMINE 30 MG/ML
15 INJECTION, SOLUTION INTRAMUSCULAR; INTRAVENOUS
Status: COMPLETED | OUTPATIENT
Start: 2024-08-08 | End: 2024-08-08

## 2024-08-08 RX ORDER — DIPHENHYDRAMINE HYDROCHLORIDE 50 MG/ML
12.5 INJECTION INTRAMUSCULAR; INTRAVENOUS
Status: COMPLETED | OUTPATIENT
Start: 2024-08-08 | End: 2024-08-08

## 2024-08-08 RX ORDER — TRIAMCINOLONE ACETONIDE 1 MG/G
CREAM TOPICAL 2 TIMES DAILY
Qty: 80 G | Refills: 11 | Status: SHIPPED | OUTPATIENT
Start: 2024-08-08 | End: 2025-08-08

## 2024-08-08 RX ADMIN — METOCLOPRAMIDE 10 MG: 5 INJECTION, SOLUTION INTRAMUSCULAR; INTRAVENOUS at 04:08

## 2024-08-08 RX ADMIN — DIPHENHYDRAMINE HYDROCHLORIDE 12.5 MG: 50 INJECTION, SOLUTION INTRAMUSCULAR; INTRAVENOUS at 04:08

## 2024-08-08 RX ADMIN — ACETAMINOPHEN 1000 MG: 500 TABLET ORAL at 02:08

## 2024-08-08 RX ADMIN — KETOROLAC TROMETHAMINE 15 MG: 30 INJECTION, SOLUTION INTRAMUSCULAR at 02:08

## 2024-08-08 NOTE — ED PROVIDER NOTES
Encounter Date: 8/8/2024       History     Chief Complaint   Patient presents with    Headache     Left side headache x 1 month. No relief with ibuprofen. No history of migraines +N      Patient complains of left-sided headache from 1 month worsening.  Patient states she has had similar symptoms off and on intermittently states her primary care doctor does not take her seriously.  Denies unilateral weakness or speech difficulty denies photophobia or nausea.  Tried ibuprofen at home        Review of patient's allergies indicates:   Allergen Reactions    Shellfish containing products      Past Medical History:   Diagnosis Date    Allergic state     Allergy     Asthma     Asthma, not well controlled     Eczema      Past Surgical History:   Procedure Laterality Date    None       No family history on file.  Social History     Tobacco Use    Smoking status: Never    Smokeless tobacco: Never    Tobacco comments:     No ALEXANDR   Substance Use Topics    Alcohol use: No     Alcohol/week: 0.0 standard drinks of alcohol    Drug use: No     Review of Systems   Constitutional:  Negative for fever.   HENT:  Negative for sore throat.    Respiratory:  Negative for shortness of breath.    Cardiovascular:  Negative for chest pain.   Gastrointestinal:  Negative for nausea.   Genitourinary:  Negative for dysuria.   Musculoskeletal:  Negative for back pain.   Skin:  Negative for rash.   Neurological:  Negative for weakness.   Hematological:  Does not bruise/bleed easily.       Physical Exam     Initial Vitals [08/08/24 1306]   BP Pulse Resp Temp SpO2   (!) 148/82 99 16 98.4 °F (36.9 °C) 99 %      MAP       --         Physical Exam    Nursing note and vitals reviewed.  Constitutional: She appears well-developed and well-nourished. She is not diaphoretic. She is active.  Non-toxic appearance. No distress.   HENT:   Head: Normocephalic and atraumatic.   Eyes: Conjunctivae are normal. Right eye exhibits no discharge. Left eye exhibits no  discharge. No scleral icterus.   Neck:   Normal range of motion.  Cardiovascular:  Normal rate, regular rhythm and intact distal pulses.           No murmur heard.  Pulmonary/Chest: Breath sounds normal. No respiratory distress. She has no wheezes.   Abdominal: She exhibits no distension.   Musculoskeletal:         General: No tenderness. Normal range of motion.      Cervical back: Normal range of motion.     Neurological: She is alert and oriented to person, place, and time. She has normal strength. No cranial nerve deficit. GCS score is 15. GCS eye subscore is 4. GCS verbal subscore is 5. GCS motor subscore is 6.   Skin: Skin is warm and dry. Capillary refill takes less than 2 seconds. No rash noted.   Psychiatric: She has a normal mood and affect. Her behavior is normal. Judgment and thought content normal.         ED Course   Procedures  Labs Reviewed   URINALYSIS, REFLEX TO URINE CULTURE - Abnormal       Result Value    Specimen UA Urine, Clean Catch      Color, UA Yellow      Appearance, UA Clear      pH, UA 6.0      Specific Gravity, UA 1.030      Protein, UA 2+ (*)     Glucose, UA Negative      Ketones, UA Negative      Bilirubin (UA) Negative      Occult Blood UA Trace (*)     Nitrite, UA Negative      Urobilinogen, UA Negative      Leukocytes, UA Negative      Narrative:     Specimen Source->Urine   URINALYSIS MICROSCOPIC - Abnormal    RBC, UA 1      WBC, UA 8 (*)     Bacteria Occasional      Squam Epithel, UA 5      Hyaline Casts, UA 3 (*)     Microscopic Comment SEE COMMENT      Narrative:     Specimen Source->Urine   CBC W/ AUTO DIFFERENTIAL - Abnormal    WBC 5.97      RBC 4.08      Hemoglobin 12.4      Hematocrit 35.8 (*)     MCV 88      MCH 30.4      MCHC 34.6      RDW 11.6      Platelets 211      MPV 9.3      Immature Granulocytes 0.2      Gran # (ANC) 3.9      Immature Grans (Abs) 0.01      Lymph # 1.5      Mono # 0.3      Eos # 0.1      Baso # 0.03      nRBC 0      Gran % 66.0      Lymph % 25.6       Mono % 5.4      Eosinophil % 2.3      Basophil % 0.5      Differential Method Automated     PREGNANCY TEST, URINE RAPID    Preg Test, Ur Negative      Narrative:     Specimen Source->Urine   COMPREHENSIVE METABOLIC PANEL    Sodium 139      Potassium 3.6      Chloride 103      CO2 25      Glucose 91      BUN 8      Creatinine 0.8      Calcium 9.1      Total Protein 7.8      Albumin 3.6      Total Bilirubin 0.3      Alkaline Phosphatase 62      AST 13      ALT 11      eGFR >60      Anion Gap 11            Imaging Results              CT Head Without Contrast (Final result)  Result time 08/08/24 17:13:29      Final result by Rosemary Ortiz MD (08/08/24 17:13:29)                   Impression:      No acute abnormality.      Electronically signed by: Rosemary Ortiz  Date:    08/08/2024  Time:    17:13               Narrative:    EXAMINATION:  CT HEAD WITHOUT CONTRAST    CLINICAL HISTORY:  Headache, sudden, severe;Pt. mother demanding a head ct.;    TECHNIQUE:  Low dose axial CT images obtained throughout the head without intravenous contrast. Sagittal and coronal reconstructions were performed.    COMPARISON:  [id number]/08/[id number]    FINDINGS:  Intracranial compartment:    Ventricles and sulci are normal in size for age without evidence of hydrocephalus. No extra-axial blood or fluid collections.    The brain parenchyma appears normal. No parenchymal mass, hemorrhage, edema or major vascular distribution infarct.    Skull/extracranial contents (limited evaluation): No fracture. Mastoid air cells and paranasal sinuses are essentially clear.                                       Medications   ketorolac injection 15 mg (15 mg Intravenous Given 8/8/24 1424)   acetaminophen tablet 1,000 mg (1,000 mg Oral Given 8/8/24 1422)   diphenhydrAMINE injection 12.5 mg (12.5 mg Intravenous Given 8/8/24 1617)   metoclopramide injection 10 mg (10 mg Intravenous Given 8/8/24 1618)     Medical Decision Making  After insisting that  she be workedup and her mother wanting her to have a CT scan.  The patient then did not want to wait for results and left AMA.  Differential diagnosis considered but not limited to; tension headache, sinus headache    Amount and/or Complexity of Data Reviewed  Labs: ordered.  Radiology: ordered.    Risk  OTC drugs.  Prescription drug management.                                      Clinical Impression:  Final diagnoses:  [G44.209] Tension-type headache, not intractable, unspecified chronicity pattern (Primary)          ED Disposition Condition    AMA Stable                Lobo Irwin NP  08/10/24 0883